# Patient Record
Sex: FEMALE | Race: WHITE | NOT HISPANIC OR LATINO | Employment: OTHER | URBAN - METROPOLITAN AREA
[De-identification: names, ages, dates, MRNs, and addresses within clinical notes are randomized per-mention and may not be internally consistent; named-entity substitution may affect disease eponyms.]

---

## 2019-01-01 ENCOUNTER — HOSPITAL ENCOUNTER (OUTPATIENT)
Dept: RADIOLOGY | Facility: MEDICAL CENTER | Age: 84
End: 2019-12-20

## 2019-01-01 ENCOUNTER — HOSPITAL ENCOUNTER (OUTPATIENT)
Facility: MEDICAL CENTER | Age: 84
End: 2019-12-21
Attending: EMERGENCY MEDICINE | Admitting: HOSPITALIST
Payer: MEDICARE

## 2019-01-01 VITALS
OXYGEN SATURATION: 88 % | HEIGHT: 64 IN | HEART RATE: 75 BPM | WEIGHT: 110 LBS | BODY MASS INDEX: 18.78 KG/M2 | DIASTOLIC BLOOD PRESSURE: 40 MMHG | SYSTOLIC BLOOD PRESSURE: 93 MMHG | RESPIRATION RATE: 18 BRPM | TEMPERATURE: 98 F

## 2019-01-01 LAB
ALBUMIN SERPL BCP-MCNC: 3.3 G/DL (ref 3.2–4.9)
ALBUMIN/GLOB SERPL: 1.4 G/DL
ALP SERPL-CCNC: 46 U/L (ref 30–99)
ALT SERPL-CCNC: 5 U/L (ref 2–50)
ANION GAP SERPL CALC-SCNC: 14 MMOL/L (ref 0–11.9)
ANISOCYTOSIS BLD QL SMEAR: ABNORMAL
AST SERPL-CCNC: 24 U/L (ref 12–45)
BASOPHILS # BLD AUTO: 0 % (ref 0–1.8)
BASOPHILS # BLD: 0 K/UL (ref 0–0.12)
BILIRUB SERPL-MCNC: 1 MG/DL (ref 0.1–1.5)
BLOOD CULTURE HOLD CXBCH: NORMAL
BUN SERPL-MCNC: 33 MG/DL (ref 8–22)
BURR CELLS BLD QL SMEAR: NORMAL
CALCIUM SERPL-MCNC: 8.4 MG/DL (ref 8.5–10.5)
CHLORIDE SERPL-SCNC: 110 MMOL/L (ref 96–112)
CO2 SERPL-SCNC: 14 MMOL/L (ref 20–33)
CREAT SERPL-MCNC: 0.83 MG/DL (ref 0.5–1.4)
EOSINOPHIL # BLD AUTO: 0 K/UL (ref 0–0.51)
EOSINOPHIL NFR BLD: 0 % (ref 0–6.9)
ERYTHROCYTE [DISTWIDTH] IN BLOOD BY AUTOMATED COUNT: 68.4 FL (ref 35.9–50)
GLOBULIN SER CALC-MCNC: 2.4 G/DL (ref 1.9–3.5)
GLUCOSE SERPL-MCNC: 125 MG/DL (ref 65–99)
HCT VFR BLD AUTO: 41.5 % (ref 37–47)
HGB BLD-MCNC: 12.2 G/DL (ref 12–16)
INR PPP: 1.3 (ref 0.87–1.13)
LACTATE BLD-SCNC: 4 MMOL/L (ref 0.5–2)
LYMPHOCYTES # BLD AUTO: 0.26 K/UL (ref 1–4.8)
LYMPHOCYTES NFR BLD: 8.8 % (ref 22–41)
MANUAL DIFF BLD: NORMAL
MCH RBC QN AUTO: 26.2 PG (ref 27–33)
MCHC RBC AUTO-ENTMCNC: 29.4 G/DL (ref 33.6–35)
MCV RBC AUTO: 89.2 FL (ref 81.4–97.8)
METAMYELOCYTES NFR BLD MANUAL: 2.6 %
MONOCYTES # BLD AUTO: 0.08 K/UL (ref 0–0.85)
MONOCYTES NFR BLD AUTO: 2.6 % (ref 0–13.4)
MORPHOLOGY BLD-IMP: NORMAL
NEUTROPHILS # BLD AUTO: 2.49 K/UL (ref 2–7.15)
NEUTROPHILS NFR BLD: 79 % (ref 44–72)
NEUTS BAND NFR BLD MANUAL: 7 % (ref 0–10)
NRBC # BLD AUTO: 0.02 K/UL
NRBC BLD-RTO: 0.7 /100 WBC
OVALOCYTES BLD QL SMEAR: NORMAL
PLATELET # BLD AUTO: 380 K/UL (ref 164–446)
PLATELET BLD QL SMEAR: NORMAL
PMV BLD AUTO: 10.1 FL (ref 9–12.9)
POIKILOCYTOSIS BLD QL SMEAR: NORMAL
POLYCHROMASIA BLD QL SMEAR: NORMAL
POTASSIUM SERPL-SCNC: 3.5 MMOL/L (ref 3.6–5.5)
PROT SERPL-MCNC: 5.7 G/DL (ref 6–8.2)
PROTHROMBIN TIME: 16.6 SEC (ref 12–14.6)
RBC # BLD AUTO: 4.65 M/UL (ref 4.2–5.4)
RBC BLD AUTO: PRESENT
SODIUM SERPL-SCNC: 138 MMOL/L (ref 135–145)
WBC # BLD AUTO: 2.9 K/UL (ref 4.8–10.8)

## 2019-01-01 PROCEDURE — 83605 ASSAY OF LACTIC ACID: CPT

## 2019-01-01 PROCEDURE — 99358 PROLONG SERVICE W/O CONTACT: CPT | Performed by: HOSPITALIST

## 2019-01-01 PROCEDURE — 96375 TX/PRO/DX INJ NEW DRUG ADDON: CPT

## 2019-01-01 PROCEDURE — G0378 HOSPITAL OBSERVATION PER HR: HCPCS

## 2019-01-01 PROCEDURE — 85007 BL SMEAR W/DIFF WBC COUNT: CPT

## 2019-01-01 PROCEDURE — 80053 COMPREHEN METABOLIC PANEL: CPT

## 2019-01-01 PROCEDURE — 96376 TX/PRO/DX INJ SAME DRUG ADON: CPT

## 2019-01-01 PROCEDURE — 99285 EMERGENCY DEPT VISIT HI MDM: CPT

## 2019-01-01 PROCEDURE — 36415 COLL VENOUS BLD VENIPUNCTURE: CPT

## 2019-01-01 PROCEDURE — 85610 PROTHROMBIN TIME: CPT

## 2019-01-01 PROCEDURE — 99220 PR INITIAL OBSERVATION CARE,LEVL III: CPT | Mod: 25 | Performed by: HOSPITALIST

## 2019-01-01 PROCEDURE — 700111 HCHG RX REV CODE 636 W/ 250 OVERRIDE (IP): Performed by: HOSPITALIST

## 2019-01-01 PROCEDURE — 700111 HCHG RX REV CODE 636 W/ 250 OVERRIDE (IP): Performed by: EMERGENCY MEDICINE

## 2019-01-01 PROCEDURE — 85027 COMPLETE CBC AUTOMATED: CPT

## 2019-01-01 PROCEDURE — 96374 THER/PROPH/DIAG INJ IV PUSH: CPT

## 2019-01-01 RX ORDER — ATROPINE SULFATE 10 MG/ML
2 SOLUTION/ DROPS OPHTHALMIC EVERY 4 HOURS PRN
Status: DISCONTINUED | OUTPATIENT
Start: 2019-01-01 | End: 2019-01-01 | Stop reason: HOSPADM

## 2019-01-01 RX ORDER — BRINZOLAMIDE 10 MG/ML
SUSPENSION/ DROPS OPHTHALMIC
Status: ON HOLD | COMMUNITY
Start: 2019-01-01 | End: 2019-01-01

## 2019-01-01 RX ORDER — HYDROMORPHONE HYDROCHLORIDE 2 MG/ML
4 INJECTION, SOLUTION INTRAMUSCULAR; INTRAVENOUS; SUBCUTANEOUS
Status: DISCONTINUED | OUTPATIENT
Start: 2019-01-01 | End: 2019-01-01 | Stop reason: HOSPADM

## 2019-01-01 RX ORDER — ALPRAZOLAM 0.25 MG/1
TABLET ORAL 3 TIMES DAILY PRN
Status: ON HOLD | COMMUNITY
Start: 2019-01-01 | End: 2019-01-01

## 2019-01-01 RX ORDER — HYDROMORPHONE HYDROCHLORIDE 1 MG/ML
1 INJECTION, SOLUTION INTRAMUSCULAR; INTRAVENOUS; SUBCUTANEOUS ONCE
Status: COMPLETED | OUTPATIENT
Start: 2019-01-01 | End: 2019-01-01

## 2019-01-01 RX ORDER — INFLUENZA VACCINE, ADJUVANTED 15; 15; 15 UG/.5ML; UG/.5ML; UG/.5ML
INJECTION, SUSPENSION INTRAMUSCULAR
Status: ON HOLD | COMMUNITY
Start: 2019-01-01 | End: 2019-01-01

## 2019-01-01 RX ORDER — LORAZEPAM 2 MG/ML
1 INJECTION INTRAMUSCULAR
Status: DISCONTINUED | OUTPATIENT
Start: 2019-01-01 | End: 2019-01-01 | Stop reason: HOSPADM

## 2019-01-01 RX ORDER — HYDROMORPHONE HYDROCHLORIDE 2 MG/ML
2 INJECTION, SOLUTION INTRAMUSCULAR; INTRAVENOUS; SUBCUTANEOUS
Status: DISCONTINUED | OUTPATIENT
Start: 2019-01-01 | End: 2019-01-01 | Stop reason: HOSPADM

## 2019-01-01 RX ORDER — ONDANSETRON 8 MG/1
8 TABLET, ORALLY DISINTEGRATING ORAL EVERY 8 HOURS PRN
Status: DISCONTINUED | OUTPATIENT
Start: 2019-01-01 | End: 2019-01-01 | Stop reason: HOSPADM

## 2019-01-01 RX ORDER — ONDANSETRON 2 MG/ML
8 INJECTION INTRAMUSCULAR; INTRAVENOUS EVERY 8 HOURS PRN
Status: DISCONTINUED | OUTPATIENT
Start: 2019-01-01 | End: 2019-01-01 | Stop reason: HOSPADM

## 2019-01-01 RX ORDER — SIMVASTATIN 20 MG
TABLET ORAL
Status: ON HOLD | COMMUNITY
Start: 2019-01-01 | End: 2019-01-01

## 2019-01-01 RX ORDER — LORAZEPAM 2 MG/ML
1 CONCENTRATE ORAL
Status: DISCONTINUED | OUTPATIENT
Start: 2019-01-01 | End: 2019-01-01 | Stop reason: HOSPADM

## 2019-01-01 RX ORDER — CELECOXIB 200 MG/1
CAPSULE ORAL
Status: ON HOLD | COMMUNITY
Start: 2019-01-01 | End: 2019-01-01

## 2019-01-01 RX ORDER — ONDANSETRON 2 MG/ML
4 INJECTION INTRAMUSCULAR; INTRAVENOUS ONCE
Status: COMPLETED | OUTPATIENT
Start: 2019-01-01 | End: 2019-01-01

## 2019-01-01 RX ORDER — LEVOTHYROXINE SODIUM 0.07 MG/1
TABLET ORAL
Status: ON HOLD | COMMUNITY
Start: 2019-01-01 | End: 2019-01-01

## 2019-01-01 RX ADMIN — HYDROMORPHONE HYDROCHLORIDE 1 MG: 1 INJECTION, SOLUTION INTRAMUSCULAR; INTRAVENOUS; SUBCUTANEOUS at 01:21

## 2019-01-01 RX ADMIN — LORAZEPAM 1 MG: 2 INJECTION INTRAMUSCULAR; INTRAVENOUS at 23:41

## 2019-01-01 RX ADMIN — HYDROMORPHONE HYDROCHLORIDE 2 MG: 2 INJECTION, SOLUTION INTRAMUSCULAR; INTRAVENOUS; SUBCUTANEOUS at 06:18

## 2019-01-01 RX ADMIN — HYDROMORPHONE HYDROCHLORIDE 2 MG: 2 INJECTION, SOLUTION INTRAMUSCULAR; INTRAVENOUS; SUBCUTANEOUS at 03:46

## 2019-01-01 RX ADMIN — HYDROMORPHONE HYDROCHLORIDE 2 MG: 2 INJECTION, SOLUTION INTRAMUSCULAR; INTRAVENOUS; SUBCUTANEOUS at 21:24

## 2019-01-01 RX ADMIN — FENTANYL CITRATE 50 MCG: 0.05 INJECTION, SOLUTION INTRAMUSCULAR; INTRAVENOUS at 01:03

## 2019-01-01 RX ADMIN — ONDANSETRON 4 MG: 2 INJECTION INTRAMUSCULAR; INTRAVENOUS at 01:21

## 2019-01-01 RX ADMIN — HYDROMORPHONE HYDROCHLORIDE 2 MG: 2 INJECTION, SOLUTION INTRAMUSCULAR; INTRAVENOUS; SUBCUTANEOUS at 09:01

## 2019-01-01 RX ADMIN — HYDROMORPHONE HYDROCHLORIDE 2 MG: 2 INJECTION, SOLUTION INTRAMUSCULAR; INTRAVENOUS; SUBCUTANEOUS at 15:42

## 2019-01-01 ASSESSMENT — COGNITIVE AND FUNCTIONAL STATUS - GENERAL
CLIMB 3 TO 5 STEPS WITH RAILING: A LITTLE
TOILETING: A LITTLE
WALKING IN HOSPITAL ROOM: A LOT
EATING MEALS: A LITTLE
SUGGESTED CMS G CODE MODIFIER DAILY ACTIVITY: CJ
STANDING UP FROM CHAIR USING ARMS: A LITTLE
HELP NEEDED FOR BATHING: A LITTLE
DRESSING REGULAR LOWER BODY CLOTHING: A LITTLE
TURNING FROM BACK TO SIDE WHILE IN FLAT BAD: A LITTLE
MOVING TO AND FROM BED TO CHAIR: A LITTLE
DAILY ACTIVITIY SCORE: 20
SUGGESTED CMS G CODE MODIFIER MOBILITY: CK
MOBILITY SCORE: 17
MOVING FROM LYING ON BACK TO SITTING ON SIDE OF FLAT BED: A LITTLE

## 2019-01-01 ASSESSMENT — ENCOUNTER SYMPTOMS
DEPRESSION: 0
DIZZINESS: 0
SHORTNESS OF BREATH: 0
CHILLS: 0
NAUSEA: 1
VOMITING: 1
COUGH: 0
PALPITATIONS: 0
EYE DISCHARGE: 0
ABDOMINAL PAIN: 1
BLOOD IN STOOL: 1
WEAKNESS: 0
FEVER: 0
SENSORY CHANGE: 0
DOUBLE VISION: 0
FLANK PAIN: 0
MYALGIAS: 0
FOCAL WEAKNESS: 0
BRUISES/BLEEDS EASILY: 0
HEMOPTYSIS: 0
BLURRED VISION: 0
HALLUCINATIONS: 0
HEARTBURN: 0
SPEECH CHANGE: 0

## 2019-01-01 ASSESSMENT — LIFESTYLE VARIABLES
EVER FELT BAD OR GUILTY ABOUT YOUR DRINKING: NO
AVERAGE NUMBER OF DAYS PER WEEK YOU HAVE A DRINK CONTAINING ALCOHOL: 0
CONSUMPTION TOTAL: NEGATIVE
EVER_SMOKED: YES
SUBSTANCE_ABUSE: 0
ALCOHOL_USE: NO
HOW MANY TIMES IN THE PAST YEAR HAVE YOU HAD 5 OR MORE DRINKS IN A DAY: 0
HAVE YOU EVER FELT YOU SHOULD CUT DOWN ON YOUR DRINKING: NO
DO YOU DRINK ALCOHOL: NO
TOTAL SCORE: 0
ON A TYPICAL DAY WHEN YOU DRINK ALCOHOL HOW MANY DRINKS DO YOU HAVE: 0
TOTAL SCORE: 0
EVER HAD A DRINK FIRST THING IN THE MORNING TO STEADY YOUR NERVES TO GET RID OF A HANGOVER: NO
TOTAL SCORE: 0
DOES PATIENT WANT TO STOP DRINKING: NO
HAVE PEOPLE ANNOYED YOU BY CRITICIZING YOUR DRINKING: NO

## 2019-01-01 ASSESSMENT — PATIENT HEALTH QUESTIONNAIRE - PHQ9
1. LITTLE INTEREST OR PLEASURE IN DOING THINGS: NOT AT ALL
2. FEELING DOWN, DEPRESSED, IRRITABLE, OR HOPELESS: NOT AT ALL
SUM OF ALL RESPONSES TO PHQ9 QUESTIONS 1 AND 2: 0

## 2019-01-01 ASSESSMENT — COPD QUESTIONNAIRES
DURING THE PAST 4 WEEKS HOW MUCH DID YOU FEEL SHORT OF BREATH: NONE/LITTLE OF THE TIME
COPD SCREENING SCORE: 2
DO YOU EVER COUGH UP ANY MUCUS OR PHLEGM?: NO/ONLY WITH OCCASIONAL COLDS OR INFECTIONS
HAVE YOU SMOKED AT LEAST 100 CIGARETTES IN YOUR ENTIRE LIFE: NO/DON'T KNOW
IN THE PAST 12 MONTHS DO YOU DO LESS THAN YOU USED TO BECAUSE OF YOUR BREATHING PROBLEMS: DISAGREE/UNSURE

## 2019-12-20 PROBLEM — Z51.5 COMFORT MEASURES ONLY STATUS: Status: ACTIVE | Noted: 2019-01-01

## 2019-12-20 PROBLEM — I25.10 CAD (CORONARY ARTERY DISEASE): Status: ACTIVE | Noted: 2019-01-01

## 2019-12-20 PROBLEM — I48.91 AF (ATRIAL FIBRILLATION) (HCC): Status: ACTIVE | Noted: 2019-01-01

## 2019-12-20 PROBLEM — K63.1 BOWEL PERFORATION (HCC): Status: ACTIVE | Noted: 2019-01-01

## 2019-12-20 PROBLEM — E87.20 LACTIC ACIDOSIS: Status: ACTIVE | Noted: 2019-01-01

## 2019-12-20 PROBLEM — E78.5 HLD (HYPERLIPIDEMIA): Status: ACTIVE | Noted: 2019-01-01

## 2019-12-20 NOTE — PROGRESS NOTES
Patient seen and examined, admitted earlier today.  Patient received pain medication for comfort so when seen was minimally responsive.

## 2019-12-20 NOTE — ED PROVIDER NOTES
ED Provider Note    CHIEF COMPLAINT  Chief Complaint   Patient presents with   • Abdominal Pain     transfer from [facility], confirmed per bowel       HPI  Shahla Gomez is a 89 y.o. female who presents transferred from [place] for management of perforated viscus.  Patient with a history of bowel perforation as a complication to a colonoscopy years ago this was treated with a diversion but patient reports this was taken down years ago.  She reports 2 days of abdominal pain nausea and vomiting, emesis is dark, like ice tea.  She reports pain is severe and coming and going but became more constant throughout the course.  Patient was seen at [facility] where a CT revealed evidence of free air consistent with perforated bowel.  There was also thickened small bowel which looks consistent with peritonitis, questionable septic arthritis of the left hip was also noted with destruction of the femoral head.  Patient's laboratory results were notable for an elevated lactic acid which increased.  She was sent with a lactic acid of 3.5.  Creatinine was normal.  White count was 12.6.  Patient has a history of CAD, on Plavix, she also has a history of A. fib but reports she is not on any blood thinners.    REVIEW OF SYSTEMS  ROS    See HPI for further details. All other systems are negative.     PAST MEDICAL HISTORY       SOCIAL HISTORY  Social History     Tobacco Use   • Smoking status: Not on file   Substance and Sexual Activity   • Alcohol use: Not on file   • Drug use: Not on file   • Sexual activity: Not on file       SURGICAL HISTORY  patient denies any surgical history    CURRENT MEDICATIONS  Home Medications    **Home medications have not yet been reviewed for this encounter**         ALLERGIES  Allergies   Allergen Reactions   • Pcn [Penicillins] Hives       PHYSICAL EXAM  Physical Exam   Constitutional: She is oriented to person, place, and time. She appears well-developed and well-nourished.   HENT:   Head:  Normocephalic and atraumatic.   Eyes: Conjunctivae are normal.   Neck: Normal range of motion. Neck supple.   Cardiovascular: Regular rhythm.   Tachycardic   Pulmonary/Chest: Effort normal and breath sounds normal.   Abdominal:   Diffuse rebound guarding and tenderness   Neurological: She is alert and oriented to person, place, and time.   Skin: Skin is warm and dry. No rash noted.   Psychiatric: She has a normal mood and affect. Her behavior is normal.         DIAGNOSTIC STUDIES / PROCEDURES      LABS  Results for orders placed or performed during the hospital encounter of 12/19/19   CBC WITH DIFFERENTIAL   Result Value Ref Range    WBC 2.9 (L) 4.8 - 10.8 K/uL    RBC 4.65 4.20 - 5.40 M/uL    Hemoglobin 12.2 12.0 - 16.0 g/dL    Hematocrit 41.5 37.0 - 47.0 %    MCV 89.2 81.4 - 97.8 fL    MCH 26.2 (L) 27.0 - 33.0 pg    MCHC 29.4 (L) 33.6 - 35.0 g/dL    RDW 68.4 (H) 35.9 - 50.0 fL    Platelet Count 380 164 - 446 K/uL    MPV 10.1 9.0 - 12.9 fL    Neutrophils-Polys 79.00 (H) 44.00 - 72.00 %    Lymphocytes 8.80 (L) 22.00 - 41.00 %    Monocytes 2.60 0.00 - 13.40 %    Eosinophils 0.00 0.00 - 6.90 %    Basophils 0.00 0.00 - 1.80 %    Nucleated RBC 0.70 /100 WBC    Neutrophils (Absolute) 2.49 2.00 - 7.15 K/uL    Lymphs (Absolute) 0.26 (L) 1.00 - 4.80 K/uL    Monos (Absolute) 0.08 0.00 - 0.85 K/uL    Eos (Absolute) 0.00 0.00 - 0.51 K/uL    Baso (Absolute) 0.00 0.00 - 0.12 K/uL    NRBC (Absolute) 0.02 K/uL    Anisocytosis 2+    CMP   Result Value Ref Range    Sodium 138 135 - 145 mmol/L    Potassium 3.5 (L) 3.6 - 5.5 mmol/L    Chloride 110 96 - 112 mmol/L    Co2 14 (L) 20 - 33 mmol/L    Anion Gap 14.0 (H) 0.0 - 11.9    Glucose 125 (H) 65 - 99 mg/dL    Bun 33 (H) 8 - 22 mg/dL    Creatinine 0.83 0.50 - 1.40 mg/dL    Calcium 8.4 (L) 8.5 - 10.5 mg/dL    AST(SGOT) 24 12 - 45 U/L    ALT(SGPT) 5 2 - 50 U/L    Alkaline Phosphatase 46 30 - 99 U/L    Total Bilirubin 1.0 0.1 - 1.5 mg/dL    Albumin 3.3 3.2 - 4.9 g/dL    Total Protein 5.7  (L) 6.0 - 8.2 g/dL    Globulin 2.4 1.9 - 3.5 g/dL    A-G Ratio 1.4 g/dL   PT/INR   Result Value Ref Range    PT 16.6 (H) 12.0 - 14.6 sec    INR 1.30 (H) 0.87 - 1.13   LACTIC ACID   Result Value Ref Range    Lactic Acid 4.0 (HH) 0.5 - 2.0 mmol/L   ESTIMATED GFR   Result Value Ref Range    GFR If African American >60 >60 mL/min/1.73 m 2    GFR If Non African American >60 >60 mL/min/1.73 m 2   DIFFERENTIAL MANUAL   Result Value Ref Range    Bands-Stabs 7.00 0.00 - 10.00 %    Metamyelocytes 2.60 %    Manual Diff Status PERFORMED    PERIPHERAL SMEAR REVIEW   Result Value Ref Range    Peripheral Smear Review see below    PLATELET ESTIMATE   Result Value Ref Range    Plt Estimation Normal    MORPHOLOGY   Result Value Ref Range    RBC Morphology Present     Polychromia 1+     Poikilocytosis 1+     Ovalocytes 1+     Echinocytes 1+    Blood Culture,Hold   Result Value Ref Range    Blood Culture Hold Collected          RADIOLOGY  OUTSIDE IMAGES-DX ABDOMEN   Final Result      OUTSIDE IMAGES-CT ABDOMEN /PELVIS   Final Result          30 minutes of critical care time were spent with this patient    COURSE & MEDICAL DECISION MAKING  Pertinent Labs & Imaging studies reviewed. (See chart for details)  Very unfortunate patient here with likely perforated viscus, she is nauseous and in considerable pain.  She was initially given fentanyl and Zofran which failed to resolve her symptoms and therefore she was given Dilaudid.  Patient is DNR/DNI, we discussed management of her presentation and she would like comfort care at this point, she understands that this will lead to her death without surgery she will die but that surgery is critically high risk and possibly futile but could also treat her ruptured bowel.  I did discuss the case with Dr. Abernathy who believes that palliative management is likely the best option for patient.  He will see patient when she is admitted to the floor.  I attempted to call patient's son to discuss patient's  treatment options.  I discussed the case with hospitalist who will admit.  Per hospitalist, he was able to finally reach the son who agrees with treatment plan.  Patient would like to speak with a  and we have facilitated this.  Patient admitted to the CDU for comfort care   The patient will return for worsening symptoms and is stable at the time of discharge. The patient verbalizes understanding and will comply.    FINAL IMPRESSION  1.  Acute abdominal pain, diffuse peritonitis, free air in abdomen      Electronically signed by: Skyler Headley, 12/20/2019 1:06 AM

## 2019-12-20 NOTE — PROGRESS NOTES
Received report from ED. Patient brought to floor by transport, settled in bed. Patient is A&O x 4, PIV in both forearms, patent. Patient c/o pain 7/10 - medicated per MAR. Admit profile complete. Bed in lowest locked position, call light and personal belongings with in reach. Patient educated on use of call light for assistance. All needs met at this time.

## 2019-12-20 NOTE — DISCHARGE PLANNING
Medical Social Work     SW received a call from the ERP requesting SW to try to make contact with the pt son Maldonado. SW was called [facility] and was able to make contact with him. SW advised him that the ERP would like to speak to him to update him on the pt condition. The MD called Diaz and update him on the pt condition.     The ERP requested SW call the  on call for the pt to get her last rights. EDWIN called the  on call and spoke with father Oneal and advised him that we have a pt who would like last right. Father Oneal advised SW that he would be on his way to [facility].     Plan: EDWIN will remain available for pt and family support.

## 2019-12-20 NOTE — ASSESSMENT & PLAN NOTE
Patient alert and orietned at the time of my examination and her wishes were to procede with comfort care measures as she was deemed a nonsurgical candidate. Given her age I discussed these findings also with her son Diaz who is in agreement as well.   Admit for comfort care   Pain control   Anxiolytics  Attempt to keep as comfortable as possible

## 2019-12-20 NOTE — ED NOTES
ERP and admitting MD at bedside, pt A&OX3, appropriate.  Pt informed that per surgeon, pt not a candidate for surgery.

## 2019-12-20 NOTE — ED NOTES
"Chief Complaint   Patient presents with   • Abdominal Pain     transfer from [facility], confirmed per bowel       Pt BIB [facility] from [facility] with reported Perferated Bowel.  Imaging in process of uploading.  Pt c/o 10/10 pain in abdomen, n/v.  PTA pt received 250 cc NS, 5 mg Morphine, 100mcg Fentanyl, and zosyn.      BP (!) 96/56   Pulse 100   Temp 37.1 °C (98.7 °F) (Temporal)   Ht 1.626 m (5' 4\")   Wt 49.9 kg (110 lb)   SpO2 100%   BMI 18.88 kg/m²    "

## 2019-12-20 NOTE — H&P
Hospital Medicine History & Physical Note    Date of Service  12/20/2019    Primary Care Physician  Pcp Unknown    Consultants  Dr. Abernathy surgery     Code Status  Dnr/dni    Chief Complaint  abd pain     History of Presenting Illness  89 y.o. female who presented 12/19/2019 with past medical history of hypothyroidism, atrial fibrillation, coronary artery disease, anxiety, hyperlipidemia, previous bowel perforation who presents with abdominal pain.  This patient has history of bowel perforation complication of colonoscopy years ago.  This was treated with diversion but taken down several years ago as well.  She has had 2 days of abdominal pain nausea vomiting emesis is dark and coffee-ground.  She has had severe abdominal pain intermittent throughout the last several days.  She is found to have a CT evidence of perforated bowel at outside hospital.  After surgical evaluation this patient was deemed a nonsurgical candidate.  After further conversation with this patient she would like to be comfort measures and I discussed these findings with her son Maldonado.      Upon review of outside hospital records patient's glucose 176 BUN 31 creatinine 1.0 sodium 142 potassium 3.2 chloride 102 carbon dioxide 24 anion gap 16.0 calcium 10.0 alkaline phosphatase 88 ALT 16 AST 18 albumin 4.2 WBC count 12.6 hemoglobin 12.9 platelet count 423 lactic acid 3.7 CT abdomen pelvis free intraperitoneal air, more in the upper abdomen.  This most likely represents sequela of perforated bowel.  Thickened distal stomach possible perforated ulcer.  Extensive fluid in the abdomen and pelvis.  Thickened small bowel may be secondary to peritonitis.  No evidence of bowel obstruction.  Septic arthritis left hip with destruction of the femoral head.  Severe cardiomegaly.    Review of Systems  Review of Systems   Constitutional: Positive for malaise/fatigue. Negative for chills and fever.   HENT: Negative for congestion, hearing loss and tinnitus.     Eyes: Negative for blurred vision, double vision and discharge.   Respiratory: Negative for cough, hemoptysis and shortness of breath.    Cardiovascular: Negative for chest pain, palpitations and leg swelling.   Gastrointestinal: Positive for abdominal pain, blood in stool, melena, nausea and vomiting. Negative for heartburn.   Genitourinary: Negative for dysuria and flank pain.   Musculoskeletal: Negative for joint pain and myalgias.   Skin: Negative for rash.   Neurological: Negative for dizziness, sensory change, speech change, focal weakness and weakness.   Endo/Heme/Allergies: Negative for environmental allergies. Does not bruise/bleed easily.   Psychiatric/Behavioral: Negative for depression, hallucinations and substance abuse.       Past Medical History  CAD, anxiety, afibb, hypothyroidism, hld     Surgical History  Reviewed and not pertinent     Family History  Reviewed and not peritnent     Social History  Denies alcohol abuse, drug use or smoking    Allergies  Allergies   Allergen Reactions   • Pcn [Penicillins] Hives       Medications  None       Physical Exam  Temp:  [37.1 °C (98.7 °F)] 37.1 °C (98.7 °F)  Pulse:  [100-109] 100  Resp:  [26] 26  BP: (96)/(56) 96/56  SpO2:  [99 %-100 %] 100 %    Physical Exam  Vitals signs reviewed.   Constitutional:       General: She is in acute distress.      Appearance: Normal appearance. She is ill-appearing.   HENT:      Head: Normocephalic and atraumatic.      Nose: No congestion.      Mouth/Throat:      Mouth: Mucous membranes are dry.   Eyes:      Extraocular Movements: Extraocular movements intact.      Pupils: Pupils are equal, round, and reactive to light.   Neck:      Musculoskeletal: Normal range of motion and neck supple. No muscular tenderness.   Cardiovascular:      Rate and Rhythm: Regular rhythm. Tachycardia present.      Pulses: Normal pulses.      Heart sounds: Normal heart sounds. No murmur.   Pulmonary:      Effort: Pulmonary effort is normal. No  respiratory distress.      Breath sounds: Normal breath sounds. No stridor.   Abdominal:      General: Bowel sounds are normal. There is distension.      Palpations: Abdomen is soft.      Tenderness: There is tenderness.      Comments: Rigid abdomen and diffuse TTP    Musculoskeletal:         General: No swelling or deformity.   Skin:     General: Skin is warm and dry.      Capillary Refill: Capillary refill takes 2 to 3 seconds.   Neurological:      General: No focal deficit present.      Mental Status: She is alert and oriented to person, place, and time.   Psychiatric:         Mood and Affect: Mood normal.         Behavior: Behavior normal.         Thought Content: Thought content normal.         Judgment: Judgment normal.      Comments: anxious         Laboratory:          Recent Labs     12/20/19  0030   ALTSGPT 5   ASTSGOT 24   ALKPHOSPHAT 46   TBILIRUBIN 1.0   GLUCOSE 125*     Recent Labs     12/20/19  0030   INR 1.30*     No results for input(s): NTPROBNP in the last 72 hours.      No results for input(s): TROPONINT in the last 72 hours.    Urinalysis:    No results found     Imaging:  OUTSIDE IMAGES-DX ABDOMEN   Final Result      OUTSIDE IMAGES-CT ABDOMEN /PELVIS   Final Result            Assessment/Plan:  I anticipate this patient is appropriate for observation status at this time.    * Comfort measures only status  Assessment & Plan  Patient alert and orietned at the time of my examination and her wishes were to procede with comfort care measures as she was deemed a nonsurgical candidate. Given her age I discussed these findings also with her son Diaz who is in agreement as well.   Admit for comfort care   Pain control   Anxiolytics  Attempt to keep as comfortable as possible     HLD (hyperlipidemia)  Assessment & Plan  Hold home medications given comfort care     AF (atrial fibrillation) (HCC)  Assessment & Plan  Hx of hold home meds given comfort measures    CAD (coronary artery disease)  Assessment &  Plan  Comfort care will hold home meds    Bowel perforation (HCC)  Assessment & Plan  Noted on CT scan, discussed case with Dr. Abernathy and patient not surgical candidate      Lactic acidosis  Assessment & Plan  Due to bowel perforation and perotinitis        VTE prophylaxis: none    I spent a total of 30 minutes of non face to face time performing additional research, reviewing old medical records, provided on going management by following up on labs, placing orders, discussing plan of care with other healthcare providers and nursing staff. Start time: 1:31 am. End time: 2:01 am

## 2019-12-20 NOTE — CARE PLAN
Problem: Safety  Goal: Will remain free from falls  Outcome: PROGRESSING AS EXPECTED  Intervention: Implement fall precautions  Flowsheets (Taken 12/20/2019 0409)  Bed Alarm: Yes - Alarm On  Environmental Precautions: Treaded Slipper Socks on Patient; Bed in Low Position  Chair/Bed Strip Alarm: Yes - Alarm On     Problem: Communication  Goal: The ability to communicate needs accurately and effectively will improve  Outcome: PROGRESSING AS EXPECTED  Intervention: [place] patient and significant other/support system to call light to alert staff of needs  Flowsheets (Taken 12/20/2019 0409)  Oriented to:: Call Light & Bedside Controls; Patient Education Notebook; Bedside Rail Policy; Patient Rights and Responsibilities; Bedside Report

## 2019-12-20 NOTE — PROGRESS NOTES
2 RN Skin Check    2 RN skin check complete with Joe ONOFRE.   Devices in place: Nasal Cannula.  Skin assessed under devices: yes.  Confirmed pressure ulcers found on: N/A.  New potential pressure ulcers noted on N/A. Wound consult placed No.  The following interventions in place Pillows and Waffle bed overlay.  Patient left buttocks is red/blanching, educated patient on frequent turns to relieve pressure on buttocks. Both heels boggy/blanching, pillows used to float heels.

## 2019-12-21 NOTE — PROGRESS NOTES
Patient resting comfortably. Family at bedside. [place] placed this afternoon, patient tolerated well. Pain medication given as needed. Call light within reach hourly rounding in practice.

## 2019-12-21 NOTE — DISCHARGE SUMMARY
Death Summary    Cause of Death  Cardiopulmonary arrest due to respiratory failure due to spontaneous bowel perforation.    Comorbid Conditions at the Time of Death  Principal Problem:    Comfort measures only status POA: Unknown  Active Problems:    Lactic acidosis POA: Unknown    Bowel perforation (HCC) POA: Unknown    CAD (coronary artery disease) POA: Unknown    AF (atrial fibrillation) (HCC) POA: Unknown    HLD (hyperlipidemia) POA: Unknown  Resolved Problems:    * No resolved hospital problems. *      History of Presenting Illness and Hospital Course  This is a 89 y.o. female admitted 12/19/2019 with past medical history of hypothyroidism, atrial fibrillation, coronary artery disease, anxiety, hyperlipidemia, previous bowel perforation who presents with abdominal pain.  This patient has history of bowel perforation complication of colonoscopy years ago.  This was treated with diversion but taken down several years ago as well.  She has had 2 days of abdominal pain nausea vomiting emesis is dark and coffee-ground.  She has had severe abdominal pain intermittent throughout the last several days.  She is found to have a CT evidence of perforated bowel at outside hospital.  After surgical evaluation this patient was deemed a nonsurgical candidate.  After further conversation with this patient she wanted to be comfort measures only and was admitted as such.  She passed away with family at bedside.    Death Date: 12/21/19   Death Time: 0002      Pronounced By (MD): Dr. Torres Cerna  Pronounced By (RN1): Phillip Leroy  Pronounced By (RN2): Kyndra Holstein

## 2019-12-21 NOTE — PROGRESS NOTES
Patient death at 0002 on 12/21/19. Verified by 2 RN's. Hospitalist on call notified. Family in room.

## 2021-05-07 NOTE — ASSESSMENT & PLAN NOTE
Hx of hold home meds given comfort measures   Skin normal color for race, warm, dry and intact. No evidence of rash.